# Patient Record
Sex: MALE | Race: WHITE | NOT HISPANIC OR LATINO | URBAN - METROPOLITAN AREA
[De-identification: names, ages, dates, MRNs, and addresses within clinical notes are randomized per-mention and may not be internally consistent; named-entity substitution may affect disease eponyms.]

---

## 2020-09-11 ENCOUNTER — WEB ENCOUNTER (OUTPATIENT)
Dept: URBAN - METROPOLITAN AREA CLINIC 12 | Facility: CLINIC | Age: 85
End: 2020-09-11

## 2020-09-16 ENCOUNTER — DASHBOARD ENCOUNTERS (OUTPATIENT)
Age: 85
End: 2020-09-16

## 2020-09-16 ENCOUNTER — LAB OUTSIDE AN ENCOUNTER (OUTPATIENT)
Dept: URBAN - METROPOLITAN AREA CLINIC 23 | Facility: CLINIC | Age: 85
End: 2020-09-16

## 2020-09-16 ENCOUNTER — OFFICE VISIT (OUTPATIENT)
Dept: URBAN - METROPOLITAN AREA CLINIC 23 | Facility: CLINIC | Age: 85
End: 2020-09-16
Payer: MEDICARE

## 2020-09-16 DIAGNOSIS — Z95.0 PACEMAKER: ICD-10-CM

## 2020-09-16 DIAGNOSIS — R13.10 DYSPHAGIA: ICD-10-CM

## 2020-09-16 DIAGNOSIS — I35.9 AORTIC VALVE DISEASE: ICD-10-CM

## 2020-09-16 PROCEDURE — G9903 PT SCRN TBCO ID AS NON USER: HCPCS | Performed by: INTERNAL MEDICINE

## 2020-09-16 PROCEDURE — 99204 OFFICE O/P NEW MOD 45 MIN: CPT | Performed by: INTERNAL MEDICINE

## 2020-09-16 NOTE — HPI-TODAY'S VISIT:
86-year-old male presented for worsening dysphagia, mostly for solids and pills, I feel food get stuck in the upper part of his esophagus. He had trans esophageal echo about a year ago and reports since then he can feel pain in food gets stuck in the upper esophagus, frequency and intensity of his dysphagia has increased he has difficulties swallowing pills, patient has valve replacement and he is on Eliquis, never had upper endoscopy had occasional heartburn and reflux.Has pacemaker